# Patient Record
Sex: FEMALE | Race: WHITE | NOT HISPANIC OR LATINO | Employment: STUDENT | URBAN - METROPOLITAN AREA
[De-identification: names, ages, dates, MRNs, and addresses within clinical notes are randomized per-mention and may not be internally consistent; named-entity substitution may affect disease eponyms.]

---

## 2024-09-07 ENCOUNTER — PATIENT MESSAGE (OUTPATIENT)
Dept: OBSTETRICS AND GYNECOLOGY | Facility: CLINIC | Age: 39
End: 2024-09-07
Payer: COMMERCIAL

## 2024-09-10 NOTE — TELEPHONE ENCOUNTER
Since I moved from family medicine she really needs an appontment to establish with someone and get this prescription.

## 2024-09-16 ENCOUNTER — HOSPITAL ENCOUNTER (OUTPATIENT)
Dept: RADIOLOGY | Facility: HOSPITAL | Age: 39
Discharge: HOME | End: 2024-09-16
Attending: PODIATRIST
Payer: COMMERCIAL

## 2024-09-16 ENCOUNTER — OFFICE VISIT (OUTPATIENT)
Dept: PODIATRY | Facility: CLINIC | Age: 39
End: 2024-09-16
Payer: COMMERCIAL

## 2024-09-16 VITALS
HEIGHT: 67 IN | SYSTOLIC BLOOD PRESSURE: 109 MMHG | HEART RATE: 86 BPM | BODY MASS INDEX: 45.99 KG/M2 | WEIGHT: 293 LBS | DIASTOLIC BLOOD PRESSURE: 77 MMHG

## 2024-09-16 DIAGNOSIS — M89.9 OSTEOCHONDRAL LESION OF TALAR DOME: ICD-10-CM

## 2024-09-16 DIAGNOSIS — S99.921D FOOT INJURY, RIGHT, SUBSEQUENT ENCOUNTER: ICD-10-CM

## 2024-09-16 DIAGNOSIS — M76.70 PERONEAL TENDONITIS, UNSPECIFIED LATERALITY: ICD-10-CM

## 2024-09-16 DIAGNOSIS — S99.911D ANKLE INJURY, RIGHT, SUBSEQUENT ENCOUNTER: Primary | ICD-10-CM

## 2024-09-16 DIAGNOSIS — M94.9 OSTEOCHONDRAL LESION OF TALAR DOME: ICD-10-CM

## 2024-09-16 DIAGNOSIS — S99.911D ANKLE INJURY, RIGHT, SUBSEQUENT ENCOUNTER: ICD-10-CM

## 2024-09-16 DIAGNOSIS — M24.271 DISORDER OF LIGAMENT OF RIGHT ANKLE: ICD-10-CM

## 2024-09-16 PROCEDURE — 73610 X-RAY EXAM OF ANKLE: CPT | Mod: 26,RT,, | Performed by: RADIOLOGY

## 2024-09-16 PROCEDURE — 99214 OFFICE O/P EST MOD 30 MIN: CPT | Mod: S$GLB,,, | Performed by: PODIATRIST

## 2024-09-16 PROCEDURE — 73610 X-RAY EXAM OF ANKLE: CPT | Mod: TC,RT

## 2024-09-16 PROCEDURE — 73630 X-RAY EXAM OF FOOT: CPT | Mod: 26,RT,, | Performed by: RADIOLOGY

## 2024-09-16 PROCEDURE — 73630 X-RAY EXAM OF FOOT: CPT | Mod: TC,RT

## 2024-09-16 PROCEDURE — 99999 PR PBB SHADOW E&M-EST. PATIENT-LVL V: CPT | Mod: PBBFAC,,, | Performed by: PODIATRIST

## 2024-09-16 RX ORDER — DOXYCYCLINE 100 MG/1
CAPSULE ORAL
COMMUNITY
Start: 2024-06-16 | End: 2024-09-16

## 2024-09-16 RX ORDER — PREDNISONE 20 MG/1
20 TABLET ORAL 2 TIMES DAILY
COMMUNITY
Start: 2024-08-13 | End: 2024-09-16

## 2024-09-16 RX ORDER — LISINOPRIL 5 MG/1
1 TABLET ORAL DAILY
COMMUNITY
End: 2024-09-16

## 2024-09-16 RX ORDER — LISINOPRIL 5 MG/1
5 TABLET ORAL DAILY
Qty: 90 TABLET | Refills: 3 | Status: SHIPPED | OUTPATIENT
Start: 2024-09-16

## 2024-09-16 RX ORDER — SPIRONOLACTONE 50 MG/1
50 TABLET, FILM COATED ORAL
COMMUNITY

## 2024-09-16 RX ORDER — SERTRALINE HYDROCHLORIDE 100 MG/1
1 TABLET, FILM COATED ORAL DAILY
COMMUNITY
End: 2024-09-16 | Stop reason: SDUPTHER

## 2024-09-16 RX ORDER — FLUTICASONE PROPIONATE 50 MCG
1 SPRAY, SUSPENSION (ML) NASAL
COMMUNITY
Start: 2024-04-05 | End: 2024-09-16

## 2024-09-16 RX ORDER — AZITHROMYCIN 250 MG/1
250 TABLET, FILM COATED ORAL
COMMUNITY
Start: 2024-08-13 | End: 2024-09-16

## 2024-09-16 NOTE — TELEPHONE ENCOUNTER
LOV: 6/21/24 Roderick    NOV: None     Preffered Pharmacy:  [facility] ([facility] - [place], [place] - [id number] [phone]

## 2024-09-17 PROBLEM — M24.271: Status: ACTIVE | Noted: 2024-09-17

## 2024-09-17 NOTE — PROGRESS NOTES
Subjective:       Patient ID: Shaila Richards is a 39 y.o. female.    Chief Complaint: Foot Pain (Right foot ), Ankle Pain (Right foot), and Foot Swelling (/)  Patient presents for follow-up injury right foot.  Patient's initial injury occurred when she stepped in a hole rolling her foot and ankle a proximally 18 months ago.        Past Medical History:   Diagnosis Date    Anxiety     Depression     Hypertension     Morbid obesity     Panic attack      Past Surgical History:   Procedure Laterality Date    ADENOIDECTOMY  2003     SECTION      CHOLECYSTECTOMY      HYSTERECTOMY      TONSILLECTOMY      TUBAL LIGATION       Family History   Problem Relation Name Age of Onset    Hypertension Father uLis     Breast cancer Maternal Aunt      Hypertension Maternal Grandfather Mauro     Diabetes Paternal Grandfather Luis     Heart disease Paternal Grandfather Luis     Asthma Son Rigoberto Richards     Asthma Son Mp Tomas      Social History     Socioeconomic History    Marital status:    Tobacco Use    Smoking status: Never    Smokeless tobacco: Never   Substance and Sexual Activity    Alcohol use: No    Drug use: No    Sexual activity: Yes     Partners: Male     Birth control/protection: See Surgical Hx   Social History Narrative    ** Merged History Encounter **          Social Determinants of Health     Financial Resource Strain: Patient Declined (2024)    Overall Financial Resource Strain (CARDIA)     Difficulty of Paying Living Expenses: Patient declined   Food Insecurity: Patient Declined (2024)    Hunger Vital Sign     Worried About Running Out of Food in the Last Year: Patient declined     Ran Out of Food in the Last Year: Patient declined   Transportation Needs: Patient Declined (2024)    PRAPARE - Transportation     Lack of Transportation (Medical): Patient declined     Lack of Transportation (Non-Medical): Patient declined   Physical Activity: Sufficiently Active (2024)     "Exercise Vital Sign     Days of Exercise per Week: 5 days     Minutes of Exercise per Session: 30 min   Stress: Stress Concern Present (6/21/2024)    [employer] - Occupational Stress Questionnaire     Feeling of Stress : To some extent   Housing Stability: Unknown (6/21/2024)    Housing Stability Vital Sign     Unable to Pay for Housing in the Last Year: No       Current Outpatient Medications   Medication Sig Dispense Refill    lisinopriL (PRINIVIL,ZESTRIL) 5 MG tablet Take 1 tablet (5 mg total) by mouth once daily. 90 tablet 3    ondansetron (ZOFRAN) 4 MG tablet Take 1 tablet (4 mg total) by mouth every 6 (six) hours as needed for Nausea. 20 tablet 1    sertraline (ZOLOFT) 100 MG tablet Take 1.5 tablets (150 mg total) by mouth once daily. 135 tablet 3    spironolactone (ALDACTONE) 50 MG tablet Take 50 mg by mouth.      tirzepatide 2.5 mg/0.5 mL PnIj Inject 2.5 mg into the skin every 7 days.       No current facility-administered medications for this visit.     Review of patient's allergies indicates:   Allergen Reactions    Bupropion hcl Swelling     Other reaction(s): angioedema    Morphine Rash     Other reaction(s): pulse drops low, Redness  Drops pulse down      Ceclor [cefaclor]        Review of Systems   Musculoskeletal:  Positive for arthralgias, gait problem and joint swelling.        Crutches   Neurological:  Positive for numbness.   All other systems reviewed and are negative.      Objective:      Vitals:    09/16/24 1619   BP: 109/77   BP Location: Left arm   Patient Position: Sitting   Pulse: 86   Weight: (!) 140.2 kg (309 lb)   Height: 5' 7" (1.702 m)     Physical Exam  Vitals and nursing note reviewed. Exam conducted with a chaperone present.   Constitutional:       General: She is not in acute distress.     Appearance: Normal appearance.   Cardiovascular:      Pulses:           Dorsalis pedis pulses are 2+ on the right side and 2+ on the left side.        Posterior " tibial pulses are 2+ on the right side and 2+ on the left side.   Pulmonary:      Effort: Pulmonary effort is normal.   Musculoskeletal:         General: Swelling, tenderness, deformity and signs of injury present.      Right foot: Decreased range of motion. Deformity present.      Left foot: No deformity.        Feet:    Feet:      Right foot:      Protective Sensation: 2 sites tested.  2 sites sensed.      Skin integrity: Erythema and warmth present.      Left foot:      Protective Sensation: 2 sites tested.  2 sites sensed.   Skin:     Capillary Refill: Capillary refill takes less than 2 seconds.      Findings: Erythema present.   Neurological:      General: No focal deficit present.      Mental Status: She is alert.      Comments: Acute neuritis pain, trauma induced, dorsal lateral right foot   Psychiatric:         Mood and Affect: Mood normal.         Behavior: Behavior normal.         Thought Content: Thought content normal.         Judgment: Judgment normal.                                                  Assessment:       1. Ankle injury, right, subsequent encounter    2. Foot injury, right, subsequent encounter    3. Peroneal tendonitis, unspecified laterality    4. Osteochondral lesion of talar dome    5. Disorder of ligament of right ankle        Plan:       Patient presents for follow-up injury right foot.  Patient's initial injury occurred when she stepped in a hole rolling her foot and ankle a proximally 18 months ago.  Patient states her ankle as never gotten any better it has continued to bother her the whole time she did not do the physical therapy as ordered so her insurance would not approve the MRI at that time.  Patient states she was on a ladder doing some painting recently and states that she did again twist her right foot and ankle she relates it became very swollen it stays swollen all the time but it was even more swollen.  Patient states the pain did extend to the top of her foot as well  as the lateral ankle right.  Patient relates almost constant pain when she is walking she states she has not worn the fracture boot she is not sure where the boot is and relates that it really does not matter what type of shoe she is wearing she has discomfort.  On evaluation patient has significant tenderness overlying the lateral compartment of the patient's right ankle dorsal and lateral aspect of the patient's right foot.  Plain film x-rays were evaluated and reviewed with the patient she does have an apparent os chondral lesion on the talar dome lateral aspect as visualized on plain film x-ray this can further be evaluated by MRI.  I have ordered a forefoot and hindfoot MRI of the right lower extremity because of the chronic nature of this injury and the patient has not shown any signs of improvement at all I feel that an MRI is warranted to evaluate the ATF and CF ligaments as well as the peroneal tendons further evaluation needs to be performed to evaluate the course of the peroneal tendons including the insertion of the peroneus brevis and the course of the peroneus longus from the lateral aspect of the patient's right foot to the plantar midfoot right.  Patient will need both MRIs performed of the forefoot and rearfoot to fully evaluate this area of previous injury.  Patient was in understanding and agreement with everything discussed I will follow up with her pending MRI to discuss treatment options and treatment plan clearly the patient is not showing any signs of improvement in the past 18 months since the initial injury she does relate pain that radiates up the side of her right leg along the course of the peroneal tendons.  There certainly is concern for peroneal tendon rupture ATF CF ligament rupture as well as the os chondral lesion of the talar dome.  Patient did indicate today she did not want to do any physical therapy she is in so much pain and discomfort she does not feel that she can stand any  physical therapy on the right lower extremity.  This note was created using [employer] voice recognition software that occasionally misinterpreted phrases or words.